# Patient Record
Sex: FEMALE | Race: WHITE | NOT HISPANIC OR LATINO | Employment: OTHER | URBAN - METROPOLITAN AREA
[De-identification: names, ages, dates, MRNs, and addresses within clinical notes are randomized per-mention and may not be internally consistent; named-entity substitution may affect disease eponyms.]

---

## 2017-02-23 ENCOUNTER — TRANSCRIBE ORDERS (OUTPATIENT)
Dept: ADMINISTRATIVE | Facility: HOSPITAL | Age: 82
End: 2017-02-23

## 2017-02-23 DIAGNOSIS — N64.4 BREAST PAIN IN FEMALE: Primary | ICD-10-CM

## 2017-02-23 DIAGNOSIS — Z12.31 VISIT FOR SCREENING MAMMOGRAM: Primary | ICD-10-CM

## 2017-02-24 ENCOUNTER — ALLSCRIPTS OFFICE VISIT (OUTPATIENT)
Dept: OTHER | Facility: OTHER | Age: 82
End: 2017-02-24

## 2017-02-24 DIAGNOSIS — N63.0 BREAST LUMP: ICD-10-CM

## 2017-02-27 ENCOUNTER — HOSPITAL ENCOUNTER (OUTPATIENT)
Dept: ULTRASOUND IMAGING | Facility: CLINIC | Age: 82
Discharge: HOME | End: 2017-02-27
Payer: MEDICARE

## 2017-02-27 ENCOUNTER — HOSPITAL ENCOUNTER (OUTPATIENT)
Dept: MAMMOGRAPHY | Facility: CLINIC | Age: 82
Discharge: HOME | End: 2017-02-27
Payer: MEDICARE

## 2017-02-27 DIAGNOSIS — N64.4 BREAST PAIN IN FEMALE: ICD-10-CM

## 2017-02-27 DIAGNOSIS — N63.0 LUMP OR MASS IN BREAST: ICD-10-CM

## 2017-02-27 PROCEDURE — G0204 DX MAMMO INCL CAD BI: HCPCS

## 2017-02-27 PROCEDURE — 76642 ULTRASOUND BREAST LIMITED: CPT

## 2018-01-14 VITALS
SYSTOLIC BLOOD PRESSURE: 124 MMHG | BODY MASS INDEX: 27.73 KG/M2 | WEIGHT: 166.44 LBS | DIASTOLIC BLOOD PRESSURE: 64 MMHG | HEIGHT: 65 IN

## 2018-02-27 ENCOUNTER — OFFICE VISIT (OUTPATIENT)
Dept: OBGYN CLINIC | Facility: MEDICAL CENTER | Age: 83
End: 2018-02-27
Payer: MEDICARE

## 2018-02-27 VITALS
DIASTOLIC BLOOD PRESSURE: 80 MMHG | HEIGHT: 65 IN | SYSTOLIC BLOOD PRESSURE: 128 MMHG | WEIGHT: 174.2 LBS | BODY MASS INDEX: 29.02 KG/M2

## 2018-02-27 DIAGNOSIS — Z12.31 ENCOUNTER FOR SCREENING MAMMOGRAM FOR MALIGNANT NEOPLASM OF BREAST: ICD-10-CM

## 2018-02-27 DIAGNOSIS — Z78.0 POSTMENOPAUSAL: ICD-10-CM

## 2018-02-27 DIAGNOSIS — Z01.419 ENCNTR FOR GYN EXAM (GENERAL) (ROUTINE) W/O ABN FINDINGS: Primary | ICD-10-CM

## 2018-02-27 PROBLEM — N63.0 BREAST LUMP: Status: ACTIVE | Noted: 2017-02-24

## 2018-02-27 PROCEDURE — G0101 CA SCREEN;PELVIC/BREAST EXAM: HCPCS | Performed by: OBSTETRICS & GYNECOLOGY

## 2018-02-27 RX ORDER — HYDROCODONE BITARTRATE AND ACETAMINOPHEN 5; 300 MG/1; MG/1
TABLET ORAL
COMMUNITY
Start: 2015-07-29 | End: 2019-06-12 | Stop reason: ALTCHOICE

## 2018-02-27 RX ORDER — FEXOFENADINE HYDROCHLORIDE 60 MG/1
TABLET, FILM COATED ORAL
COMMUNITY
Start: 2010-04-13

## 2018-02-27 RX ORDER — ESTRADIOL 2 MG/1
2 TABLET ORAL
COMMUNITY
Start: 2013-11-20 | End: 2018-10-22 | Stop reason: SDUPTHER

## 2018-02-27 RX ORDER — MULTIVIT WITH MINERALS/LUTEIN
TABLET ORAL
COMMUNITY

## 2018-02-27 RX ORDER — FOLIC ACID 1 MG/1
400 TABLET ORAL
COMMUNITY

## 2018-02-27 NOTE — PROGRESS NOTES
ASSESSMENT & PLAN: Sonu Sun was seen today for gynecologic exam     Diagnoses and all orders for this visit:    Encntr for gyn exam (general) (routine) w/o abn findings    Encounter for screening mammogram for malignant neoplasm of breast  -     Mammo screening bilateral w cad; Future    Postmenopausal  -     DXA bone density spine hip and pelvis; Future    Discussion/Summary; reviewed potential gyn emergencies; offered condolences for the loss of her , Jenniffer Cantu, who  6 months ago; they were  for 59 years;also sympathy card sent; Rx given for screening mammogram; RTO 2 years or prn  1  Routine well woman exam done today  2  Pap and HPV:  Pap and cotesting was not done today  3   Mammogram was ordered  4   Colonoscopy is up to date  5   DEXA is not up to date  DEXA was ordered      today  6  The following were reviewed in today's visit: breast self exam   7   F/u 2years  CC:  Annual Gynecologic Examination    HPI: Sharad Almanzar is a 81 y o  who presents for annual gynecologic examination  Health Maintenance:    Patient describes her health as good  Patient does not have weight concerns  She exercises 3 days per week with walking  She does wears her seatbelt routinely  She does perform regular monthly self breast exams  She does feel safe at home  Patients does not follow a  diet  Patient gets 4 servings of dairy or calcium rich foods daily        Last pap: not needed  Last mammogram: last year  Last colonoscopy: 3 years ago    Patient Active Problem List   Diagnosis    Abnormal finding on mammography    Bilateral thoracic back pain    Breast lump    Mammogram abnormal    Postmenopausal atrophic vaginitis    Primary osteoarthritis of both hands       Past Medical History:   Diagnosis Date    Hypertension     Migraine        Past Surgical History:   Procedure Laterality Date    FOOT SURGERY      REPLACEMENT TOTAL KNEE      TOTAL ABDOMINAL HYSTERECTOMY W/ BILATERAL SALPINGOOPHORECTOMY      onset: 1982       Past OB/Gyn History:    Patient is not currently sexually active  Family History   Problem Relation Age of Onset    Lung cancer Father     Lung cancer Paternal Grandmother     Breast cancer Family     Ovarian cancer Family        Social History:   Social History     Social History    Marital status: /Civil Union     Spouse name: N/A    Number of children: N/A    Years of education: N/A     Occupational History    Not on file  Social History Main Topics    Smoking status: Never Smoker    Smokeless tobacco: Never Used    Alcohol use Yes      Comment: Social     Drug use: Unknown    Sexual activity: No     Other Topics Concern    Not on file     Social History Narrative    No narrative on file     Presently lives with self  Patient is currently unemployed           Current Outpatient Prescriptions:     estradiol (ESTRACE) 2 MG tablet, Take 2 mg by mouth, Disp: , Rfl:     fexofenadine (ALLEGRA) 60 MG tablet, , Disp: , Rfl:     HYDROcodone-acetaminophen (XODOL) 5-300 MG per tablet, , Disp: , Rfl:     Ascorbic Acid (SM CHEWABLE VITAMIN C) 500 MG CHEW, Chew, Disp: , Rfl:     CALCIUM PO, Take by mouth, Disp: , Rfl:     Cholecalciferol (EQL VITAMIN D-3) 400 units TABS, Take by mouth, Disp: , Rfl:     folic acid (FOLVITE) 1 mg tablet, Take 400 mg by mouth, Disp: , Rfl:     polyethylene glycol-propylene glycol (SYSTANE) 0 4-0 3 %, Apply to eye Twice daily, Disp: , Rfl:     Selenium 200 MCG CAPS, Take by mouth, Disp: , Rfl:     vitamin E, tocopherol, (GNP VITAMIN E) 1,000 units capsule, Take by mouth, Disp: , Rfl:     Review of Systems  Constitutional :no fever, feels well, no tiredness, no recent weight gain or loss  ENT: no ear ache, no loss of hearing, no nosebleeds or nasal discharge, no sore throat or hoarseness    Cardiovascular: no complaints of slow or fast heart beat, no chest pain, no palpitations, no leg claudication or lower extremity edema  Respiratory: no complaints of shortness of shortness of breath, no HUITRON  Breasts:no complaints of breast pain, breast lump, or nipple discharge  Gastrointestinal: no complaints of abdominal pain, constipation,nausea, vomiting, or diarrhea or bloody stools  Genitourinary : no complaints of dysuria, incontinence, pelvic pain, no dysmenorrhea, vaginal discharge or abnormal vaginal bleeding and as noted in HPI  Integumentary: no complaints of skin rash or lesion, itching or dry skin  Neurological: no complaints of headache, no confusion, no numbness or tingling, no dizziness or fainting     Physical Exam:   /80   Ht 5' 5" (1 651 m)   Wt 79 kg (174 lb 3 2 oz)   LMP  (LMP Unknown)   Breastfeeding?  No   BMI 28 99 kg/m²     General:   appears stated age, cooperative, alert normal mood and affect   Neck: Neck: normal, supple,trachea midline, no masses   Heart: regular rate and rhythm, S1, S2 normal, no murmur, click, rub or gallop   Lungs: clear to auscultation bilaterally   Breasts: Breast exam :normal, no dimpling or skin changes noted; last year's lump on left breast NOT noted this year; she agreed and no pain that she experienced a year ago   Abdomen: soft, non-tender, without masses or organomegaly   Vulva: Normal , no lesiona   Vagina: normal , no lesions or dryness   Urethra: normal   Cervix: Surgically absent   Uterus: surgically absent   Adnexa: Surgically absent

## 2018-05-31 ENCOUNTER — HOSPITAL ENCOUNTER (OUTPATIENT)
Dept: MAMMOGRAPHY | Facility: MEDICAL CENTER | Age: 83
Discharge: HOME | End: 2018-05-31
Payer: MEDICARE

## 2018-05-31 ENCOUNTER — HOSPITAL ENCOUNTER (OUTPATIENT)
Dept: BONE DENSITY | Facility: MEDICAL CENTER | Age: 83
Discharge: HOME | End: 2018-05-31
Payer: MEDICARE

## 2018-05-31 DIAGNOSIS — Z12.31 ENCOUNTER FOR SCREENING MAMMOGRAM FOR MALIGNANT NEOPLASM OF BREAST: ICD-10-CM

## 2018-05-31 DIAGNOSIS — Z78.0 POSTMENOPAUSAL: ICD-10-CM

## 2018-05-31 PROCEDURE — 77080 DXA BONE DENSITY AXIAL: CPT

## 2018-05-31 PROCEDURE — 77063 BREAST TOMOSYNTHESIS BI: CPT

## 2018-05-31 PROCEDURE — 77067 SCR MAMMO BI INCL CAD: CPT

## 2018-10-22 DIAGNOSIS — N95.9 MENOPAUSAL DISORDER: Primary | ICD-10-CM

## 2018-10-23 RX ORDER — ESTRADIOL 2 MG/1
2 TABLET ORAL DAILY
Qty: 30 TABLET | Refills: 2 | Status: SHIPPED | OUTPATIENT
Start: 2018-10-23 | End: 2019-01-23 | Stop reason: SDUPTHER

## 2019-01-23 DIAGNOSIS — N95.9 MENOPAUSAL DISORDER: ICD-10-CM

## 2019-01-25 RX ORDER — ESTRADIOL 2 MG/1
2 TABLET ORAL DAILY
Qty: 90 TABLET | Refills: 0 | Status: SHIPPED | OUTPATIENT
Start: 2019-01-25 | End: 2019-05-02 | Stop reason: SDUPTHER

## 2019-05-02 DIAGNOSIS — N95.9 MENOPAUSAL DISORDER: ICD-10-CM

## 2019-05-02 RX ORDER — ESTRADIOL 2 MG/1
2 TABLET ORAL DAILY
Qty: 90 TABLET | Refills: 1 | Status: SHIPPED | OUTPATIENT
Start: 2019-05-02 | End: 2019-10-29 | Stop reason: SDUPTHER

## 2019-05-23 ENCOUNTER — TRANSCRIBE ORDERS (OUTPATIENT)
Dept: ADMINISTRATIVE | Facility: HOSPITAL | Age: 84
End: 2019-05-23

## 2019-05-23 DIAGNOSIS — Z12.39 BREAST SCREENING, UNSPECIFIED: Primary | ICD-10-CM

## 2019-06-12 ENCOUNTER — ANNUAL EXAM (OUTPATIENT)
Dept: OBGYN CLINIC | Facility: MEDICAL CENTER | Age: 84
End: 2019-06-12
Payer: MEDICARE

## 2019-06-12 VITALS
SYSTOLIC BLOOD PRESSURE: 120 MMHG | WEIGHT: 176.6 LBS | HEIGHT: 65 IN | BODY MASS INDEX: 29.42 KG/M2 | DIASTOLIC BLOOD PRESSURE: 70 MMHG

## 2019-06-12 DIAGNOSIS — Z01.419 ENCNTR FOR GYN EXAM (GENERAL) (ROUTINE) W/O ABN FINDINGS: Primary | ICD-10-CM

## 2019-06-12 DIAGNOSIS — Z78.0 MENOPAUSE: ICD-10-CM

## 2019-06-12 PROCEDURE — G0101 CA SCREEN;PELVIC/BREAST EXAM: HCPCS | Performed by: OBSTETRICS & GYNECOLOGY

## 2019-06-27 ENCOUNTER — HOSPITAL ENCOUNTER (OUTPATIENT)
Dept: MAMMOGRAPHY | Facility: MEDICAL CENTER | Age: 84
Discharge: HOME | End: 2019-06-27
Payer: MEDICARE

## 2019-06-27 VITALS — BODY MASS INDEX: 29.16 KG/M2 | WEIGHT: 175 LBS | HEIGHT: 65 IN

## 2019-06-27 DIAGNOSIS — Z12.39 BREAST SCREENING, UNSPECIFIED: ICD-10-CM

## 2019-06-27 PROCEDURE — 77063 BREAST TOMOSYNTHESIS BI: CPT

## 2019-06-27 PROCEDURE — 77067 SCR MAMMO BI INCL CAD: CPT

## 2019-10-29 DIAGNOSIS — N95.9 MENOPAUSAL DISORDER: ICD-10-CM

## 2019-10-29 RX ORDER — ESTRADIOL 2 MG/1
2 TABLET ORAL DAILY
Qty: 90 TABLET | Refills: 2 | Status: SHIPPED | OUTPATIENT
Start: 2019-10-29 | End: 2020-07-30 | Stop reason: SDUPTHER

## 2020-07-30 ENCOUNTER — TELEPHONE (OUTPATIENT)
Dept: OBGYN CLINIC | Facility: MEDICAL CENTER | Age: 85
End: 2020-07-30

## 2020-07-30 DIAGNOSIS — N95.9 MENOPAUSAL DISORDER: ICD-10-CM

## 2020-07-30 RX ORDER — ESTRADIOL 2 MG/1
2 TABLET ORAL DAILY
Qty: 90 TABLET | Refills: 3 | Status: SHIPPED | OUTPATIENT
Start: 2020-07-30